# Patient Record
Sex: FEMALE | Race: WHITE
[De-identification: names, ages, dates, MRNs, and addresses within clinical notes are randomized per-mention and may not be internally consistent; named-entity substitution may affect disease eponyms.]

---

## 2017-09-19 ENCOUNTER — HOSPITAL ENCOUNTER (OUTPATIENT)
Dept: HOSPITAL 45 - C.LAB1850 | Age: 27
Discharge: HOME | End: 2017-09-19
Attending: INTERNAL MEDICINE
Payer: COMMERCIAL

## 2017-09-19 DIAGNOSIS — Z13.1: ICD-10-CM

## 2017-09-19 DIAGNOSIS — Z13.220: ICD-10-CM

## 2017-09-19 DIAGNOSIS — Z00.00: Primary | ICD-10-CM

## 2017-09-19 LAB
ALBUMIN/GLOB SERPL: 1.1 {RATIO} (ref 0.9–2)
ALP SERPL-CCNC: 47 U/L (ref 45–117)
ALT SERPL-CCNC: 19 U/L (ref 12–78)
ANION GAP SERPL CALC-SCNC: 3 MMOL/L (ref 3–11)
AST SERPL-CCNC: 16 U/L (ref 15–37)
BUN SERPL-MCNC: 9 MG/DL (ref 7–18)
BUN/CREAT SERPL: 10.6 (ref 10–20)
CALCIUM SERPL-MCNC: 8.9 MG/DL (ref 8.5–10.1)
CHLORIDE SERPL-SCNC: 108 MMOL/L (ref 98–107)
CHOLEST/HDLC SERPL: 2.1 {RATIO}
CO2 SERPL-SCNC: 28 MMOL/L (ref 21–32)
CREAT SERPL-MCNC: 0.81 MG/DL (ref 0.6–1.2)
EOSINOPHIL NFR BLD AUTO: 215 K/UL (ref 130–400)
EST. AVERAGE GLUCOSE BLD GHB EST-MCNC: 91 MG/DL
GLOBULIN SER-MCNC: 3.4 GM/DL (ref 2.5–4)
GLUCOSE SERPL-MCNC: 76 MG/DL (ref 70–99)
GLUCOSE UR QL: 62 MG/DL
HCT VFR BLD CALC: 36 % (ref 37–47)
KETONES UR QL STRIP: 58 MG/DL
MCH RBC QN AUTO: 32.3 PG (ref 25–34)
MCHC RBC AUTO-ENTMCNC: 34.4 G/DL (ref 32–36)
MCV RBC AUTO: 93.8 FL (ref 80–100)
NITRITE UR QL STRIP: 41 MG/DL (ref 0–150)
PH UR: 128 MG/DL (ref 0–200)
PMV BLD AUTO: 10.2 FL (ref 7.4–10.4)
POTASSIUM SERPL-SCNC: 3.9 MMOL/L (ref 3.5–5.1)
RBC # BLD AUTO: 3.84 M/UL (ref 4.2–5.4)
SODIUM SERPL-SCNC: 139 MMOL/L (ref 136–145)
VERY LOW DENSITY LIPOPROT CALC: 8 MG/DL
WBC # BLD AUTO: 6.99 K/UL (ref 4.8–10.8)

## 2017-10-06 ENCOUNTER — HOSPITAL ENCOUNTER (OUTPATIENT)
Dept: HOSPITAL 45 - C.PAPS | Age: 27
Discharge: HOME | End: 2017-10-06
Attending: OBSTETRICS & GYNECOLOGY
Payer: COMMERCIAL

## 2017-10-06 DIAGNOSIS — Z01.419: Primary | ICD-10-CM

## 2017-11-21 ENCOUNTER — HOSPITAL ENCOUNTER (OUTPATIENT)
Dept: HOSPITAL 45 - C.LABSPEC | Age: 27
Discharge: HOME | End: 2017-11-21
Attending: OBSTETRICS & GYNECOLOGY
Payer: COMMERCIAL

## 2017-11-21 DIAGNOSIS — Z34.01: Primary | ICD-10-CM

## 2017-11-21 LAB
APPEARANCE UR: CLEAR
BILIRUB UR-MCNC: (no result) MG/DL
COLOR UR: YELLOW
MANUAL MICROSCOPIC REQUIRED?: NO
NITRITE UR QL STRIP: (no result)
PH UR STRIP: 6.5 [PH] (ref 4.5–7.5)
REVIEW REQ?: NO
SP GR UR STRIP: 1.01 (ref 1–1.03)
URINE BILL WITH OR WITHOUT MIC: (no result)
UROBILINOGEN UR-MCNC: (no result) MG/DL

## 2017-11-28 ENCOUNTER — HOSPITAL ENCOUNTER (OUTPATIENT)
Dept: HOSPITAL 45 - C.LAB1850 | Age: 27
Discharge: HOME | End: 2017-11-28
Attending: OBSTETRICS & GYNECOLOGY
Payer: COMMERCIAL

## 2017-11-28 DIAGNOSIS — Z34.01: Primary | ICD-10-CM

## 2017-11-28 LAB
BASOPHILS # BLD: 0.03 K/UL (ref 0–0.2)
BASOPHILS NFR BLD: 0.4 %
COMPLETE: YES
EOSINOPHIL NFR BLD AUTO: 279 K/UL (ref 130–400)
HCT VFR BLD CALC: 34 % (ref 37–47)
IG%: 0.1 %
IMM GRANULOCYTES NFR BLD AUTO: 16.1 %
LYMPHOCYTES # BLD: 1.33 K/UL (ref 1.2–3.4)
MCH RBC QN AUTO: 32.7 PG (ref 25–34)
MCHC RBC AUTO-ENTMCNC: 35.6 G/DL (ref 32–36)
MCV RBC AUTO: 91.9 FL (ref 80–100)
MONOCYTES NFR BLD: 8.1 %
NEUTROPHILS # BLD AUTO: 0.4 %
NEUTROPHILS NFR BLD AUTO: 74.9 %
PMV BLD AUTO: 9.9 FL (ref 7.4–10.4)
RBC # BLD AUTO: 3.7 M/UL (ref 4.2–5.4)
WBC # BLD AUTO: 8.27 K/UL (ref 4.8–10.8)

## 2017-12-01 LAB
CHLAMYDIA TRACH RNA***: NOT DETECTED
GC (NEIS GONORRHOEAE)RNA**: NOT DETECTED

## 2018-01-22 ENCOUNTER — HOSPITAL ENCOUNTER (OUTPATIENT)
Dept: HOSPITAL 45 - C.LAB | Age: 28
Discharge: HOME | End: 2018-01-22
Attending: OBSTETRICS & GYNECOLOGY
Payer: COMMERCIAL

## 2018-01-22 DIAGNOSIS — Z34.02: Primary | ICD-10-CM

## 2018-03-06 ENCOUNTER — HOSPITAL ENCOUNTER (EMERGENCY)
Dept: HOSPITAL 45 - C.EDB | Age: 28
Discharge: HOME | End: 2018-03-06
Payer: COMMERCIAL

## 2018-03-06 VITALS
WEIGHT: 129.19 LBS | BODY MASS INDEX: 25.36 KG/M2 | BODY MASS INDEX: 25.36 KG/M2 | HEIGHT: 60 IN | WEIGHT: 129.19 LBS | HEIGHT: 60 IN

## 2018-03-06 VITALS — OXYGEN SATURATION: 100 % | SYSTOLIC BLOOD PRESSURE: 118 MMHG | HEART RATE: 80 BPM | DIASTOLIC BLOOD PRESSURE: 78 MMHG

## 2018-03-06 VITALS — TEMPERATURE: 98.06 F

## 2018-03-06 DIAGNOSIS — Z3A.22: ICD-10-CM

## 2018-03-06 DIAGNOSIS — O26.892: ICD-10-CM

## 2018-03-06 DIAGNOSIS — R19.7: Primary | ICD-10-CM

## 2018-03-06 DIAGNOSIS — Z79.899: ICD-10-CM

## 2018-03-06 LAB
ALBUMIN SERPL-MCNC: 3 GM/DL (ref 3.4–5)
ALP SERPL-CCNC: 50 U/L (ref 45–117)
ALT SERPL-CCNC: 31 U/L (ref 12–78)
AST SERPL-CCNC: 19 U/L (ref 15–37)
BASOPHILS # BLD: 0.01 K/UL (ref 0–0.2)
BASOPHILS NFR BLD: 0.1 %
BUN SERPL-MCNC: 9 MG/DL (ref 7–18)
CALCIUM SERPL-MCNC: 8.5 MG/DL (ref 8.5–10.1)
CO2 SERPL-SCNC: 22 MMOL/L (ref 21–32)
CREAT SERPL-MCNC: 0.62 MG/DL (ref 0.6–1.2)
EOS ABS #: 0.01 K/UL (ref 0–0.5)
EOSINOPHIL NFR BLD AUTO: 252 K/UL (ref 130–400)
GLUCOSE SERPL-MCNC: 90 MG/DL (ref 70–99)
HCT VFR BLD CALC: 34.6 % (ref 37–47)
HGB BLD-MCNC: 12.1 G/DL (ref 12–16)
IG#: 0.07 K/UL (ref 0–0.02)
IMM GRANULOCYTES NFR BLD AUTO: 3.5 %
LIPASE: 97 U/L (ref 73–393)
LYMPHOCYTES # BLD: 0.49 K/UL (ref 1.2–3.4)
MCH RBC QN AUTO: 32.8 PG (ref 25–34)
MCHC RBC AUTO-ENTMCNC: 35 G/DL (ref 32–36)
MCV RBC AUTO: 93.8 FL (ref 80–100)
MONO ABS #: 0.79 K/UL (ref 0.11–0.59)
MONOCYTES NFR BLD: 5.6 %
NEUT ABS #: 12.67 K/UL (ref 1.4–6.5)
NEUTROPHILS # BLD AUTO: 0.1 %
NEUTROPHILS NFR BLD AUTO: 90.2 %
PMV BLD AUTO: 9.7 FL (ref 7.4–10.4)
POTASSIUM SERPL-SCNC: 3.6 MMOL/L (ref 3.5–5.1)
PROT SERPL-MCNC: 7 GM/DL (ref 6.4–8.2)
RED CELL DISTRIBUTION WIDTH CV: 12.8 % (ref 11.5–14.5)
RED CELL DISTRIBUTION WIDTH SD: 43.9 FL (ref 36.4–46.3)
SODIUM SERPL-SCNC: 135 MMOL/L (ref 136–145)
WBC # BLD AUTO: 14.04 K/UL (ref 4.8–10.8)

## 2018-03-06 NOTE — EMERGENCY ROOM VISIT NOTE
History


Report prepared by Wilfred:  Barbra Leung


Under the Supervision of:  Dr. Hailey Wallace M.D.


First contact with patient:  09:01


Chief Complaint:  DIARRHEA


Stated Complaint:  SEVERE DIARRHEA W/BLOOD & 22 WKS PREGNANT


Nursing Triage Summary:  


pt reports bilat lower abd pain started at 0100 this am has blood in stool. 

fesl 


nauseated and vomiting . unsure if related to pregnancy (Morning sickness).





History of Present Illness


The patient is a 27 year old female who presents to the Emergency Room with 

complaints of intermittent diarrhea for the past 8 hours. Last night she 

developed lower abdominal pain that she rates as a 6/10 - 10/10 in severity and 

describes as "achy." She then developed diarrhea. She states that she has had 20

+ episodes of diarrhea throughout the morning. She is now just passing mucous 

and bloody stools. She reports bright red blood in her stools. She has been 

unable to keep fluids down. Last night she had yogurt from the [employer] and 

chinese food from [employer] for dinner. Her  ate the same thing and 

has not been sick. The patient is  and is currently 22 weeks pregnant. She 

has been experiencing some morning sickness with her pregnancy and did have 1 

episode of vomiting this morning. Pt had a brownish d/c earlier this week, but 

was  told by OB it was ok.  Pt feels baby moving and denies BRB vaginally or 

change in d/c.  The patient denies any fevers, chest pain, shortness of breath, 

or urinary symptoms. She denies any recent sick contacts. She denies any 

personal history of IBS or other bowel problems.





   Source of History:  patient


   Onset:  8 hours PTA


   Position:  abdomen


   Symptom Intensity:  6/10-10/10


   Quality:  other (diarrhea)


   Timing:  intermittent


   Associated Symptoms:  + vomiting, + abdominal pain, + hematochezia, No fevers

, No chest pain, No SOB, No urinary symptoms


Note:


Pt is 22 weeks pregnant.





Review of Systems


See HPI for pertinent positives & negatives. A total of 10 systems reviewed and 

were otherwise negative.





Past Medical & Surgical


Medical Problems:


(1) 22 weeks gestation of pregnancy








Family History


No pertinent history stated.





Social History


Smoking Status:  Never Smoker


Marital Status:  


Housing Status:  lives with significant other


Occupation Status:  employed





Current/Historical Medications


Scheduled


Multivit/Min/Iron/Fol Ac/Pren (Prenatal Vitamin), 1 TAB PO DAILY


Ondasetron Odt (Zofran Odt), 4 MG SL Q6H


Pyridoxine (Vitamin B6), 100 MG PO DAILY





Scheduled PRN


Dicyclomine Hcl (Bentyl), 10 MG PO Q6 PRN for cramping


Doxylamine Succinate (Sleep) (Unisom), 25 MG PO UD PRN for Nausea





Allergies


Coded Allergies:  


     No Known Allergies (Unverified , 3/6/18)





Physical Exam


Vital Signs











  Date Time  Temp Pulse Resp B/P (MAP) Pulse Ox O2 Delivery O2 Flow Rate FiO2


 


3/6/18 14:06  80 17 118/78 100 Room Air  


 


3/6/18 13:00  72 18 118/78 100 Room Air  


 


3/6/18 11:13  80 17 115/71 100 Room Air  


 


3/6/18 10:08  84      


 


3/6/18 09:45  76 18 114/77 100 Room Air  


 


3/6/18 08:49 36.7 89 18 117/81 100 Room Air  











Physical Exam


Vital signs reviewed.





General: Well-appearing 27 year old female, in no significant distress.





HEENT: No scleral icterus, PERRLA, neck supple.  Atraumatic.





Cardiovascular: Regular rate and rhythm, no extra sounds.





Pulmonary: Clear to auscultation bilaterally, normal work of breathing.





Abdomen: Soft, gravid, nontender, nondistended, positive bowel sounds.





Musculoskeletal: Atraumatic, no peripheral edema.





Neurologic: Patient awake alert and oriented x 3





Skin: Warm, dry, no rash





Medical Decision & Procedures


Laboratory Results


3/6/18 09:50








Red Blood Count 3.69, Mean Corpuscular Volume 93.8, Mean Corpuscular Hemoglobin 

32.8, Mean Corpuscular Hemoglobin Concent 35.0, Mean Platelet Volume 9.7, 

Neutrophils (%) (Auto) 90.2, Lymphocytes (%) (Auto) 3.5, Monocytes (%) (Auto) 

5.6, Eosinophils (%) (Auto) 0.1, Basophils (%) (Auto) 0.1, Neutrophils # (Auto) 

12.67, Lymphocytes # (Auto) 0.49, Monocytes # (Auto) 0.79, Eosinophils # (Auto) 

0.01, Basophils # (Auto) 0.01





3/6/18 09:50

















Test


  3/6/18


09:50 3/6/18


10:50


 


White Blood Count


  14.04 K/uL


(4.8-10.8) 


 


 


Red Blood Count


  3.69 M/uL


(4.2-5.4) 


 


 


Hemoglobin


  12.1 g/dL


(12.0-16.0) 


 


 


Hematocrit 34.6 % (37-47)  


 


Mean Corpuscular Volume


  93.8 fL


() 


 


 


Mean Corpuscular Hemoglobin


  32.8 pg


(25-34) 


 


 


Mean Corpuscular Hemoglobin


Concent 35.0 g/dl


(32-36) 


 


 


Platelet Count


  252 K/uL


(130-400) 


 


 


Mean Platelet Volume


  9.7 fL


(7.4-10.4) 


 


 


Neutrophils (%) (Auto) 90.2 %  


 


Lymphocytes (%) (Auto) 3.5 %  


 


Monocytes (%) (Auto) 5.6 %  


 


Eosinophils (%) (Auto) 0.1 %  


 


Basophils (%) (Auto) 0.1 %  


 


Neutrophils # (Auto)


  12.67 K/uL


(1.4-6.5) 


 


 


Lymphocytes # (Auto)


  0.49 K/uL


(1.2-3.4) 


 


 


Monocytes # (Auto)


  0.79 K/uL


(0.11-0.59) 


 


 


Eosinophils # (Auto)


  0.01 K/uL


(0-0.5) 


 


 


Basophils # (Auto)


  0.01 K/uL


(0-0.2) 


 


 


RDW Standard Deviation


  43.9 fL


(36.4-46.3) 


 


 


RDW Coefficient of Variation


  12.8 %


(11.5-14.5) 


 


 


Immature Granulocyte % (Auto) 0.5 %  


 


Immature Granulocyte # (Auto)


  0.07 K/uL


(0.00-0.02) 


 


 


Anion Gap


  8.0 mmol/L


(3-11) 


 


 


Est Creatinine Clear Calc


Drug Dose 109.2 ml/min 


  


 


 


Estimated GFR (


American) 143.2 


  


 


 


Estimated GFR (Non-


American 123.6 


  


 


 


BUN/Creatinine Ratio 14.2 (10-20)  


 


Calcium Level


  8.5 mg/dl


(8.5-10.1) 


 


 


Magnesium Level


  2.0 mg/dl


(1.8-2.4) 


 


 


Total Bilirubin


  0.4 mg/dl


(0.2-1) 


 


 


Direct Bilirubin


  0.1 mg/dl


(0-0.2) 


 


 


Aspartate Amino Transf


(AST/SGOT) 19 U/L (15-37) 


  


 


 


Alanine Aminotransferase


(ALT/SGPT) 31 U/L (12-78) 


  


 


 


Alkaline Phosphatase


  50 U/L


() 


 


 


Total Protein


  7.0 gm/dl


(6.4-8.2) 


 


 


Albumin


  3.0 gm/dl


(3.4-5.0) 


 


 


Lipase


  97 U/L


() 


 


 


Urine Color  YELLOW 


 


Urine Appearance  CLEAR (CLEAR) 


 


Urine pH  6.5 (4.5-7.5) 


 


Urine Specific Gravity


  


  1.006


(1.000-1.030)


 


Urine Protein  NEG (NEG) 


 


Urine Glucose (UA)  NEG (NEG) 


 


Urine Ketones  TRACE (NEG) 


 


Urine Occult Blood  NEG (NEG) 


 


Urine Nitrite  NEG (NEG) 


 


Urine Bilirubin  NEG (NEG) 


 


Urine Urobilinogen  NEG (NEG) 


 


Urine Leukocyte Esterase  TRACE (NEG) 


 


Urine WBC (Auto)  1-5 /hpf (0-5) 


 


Urine RBC (Auto)  0-4 /hpf (0-4) 


 


Urine Hyaline Casts (Auto)  1-5 /lpf (0-5) 


 


Urine Epithelial Cells (Auto)


  


  20-30 /lpf


(0-5)


 


Urine Bacteria (Auto)  1+ (NEG) 





Laboratory results per my review.





Medications Administered











 Medications


  (Trade)  Dose


 Ordered  Sig/Jen


 Route  Start Time


 Stop Time Status Last Admin


Dose Admin


 


 Sodium Chloride  1,000 ml @ 


 999 mls/hr  Q1H1M STAT


 IV  3/6/18 09:19


 3/6/18 10:19 DC 3/6/18 09:48


999 MLS/HR


 


 Ondansetron HCl


  (Zofran Inj)  4 mg  NOW  STAT


 IV  3/6/18 09:19


 3/6/18 09:25 DC 3/6/18 09:56


4 MG


 


 Dicyclomine HCl


  (Bentyl Cap)  10 mg  NOW  ONCE


 PO  3/6/18 12:30


 3/6/18 12:31 DC 3/6/18 12:38


10 MG


 


 Ondansetron HCl


  (Zofran Inj)  4 mg  NOW  STAT


 IV  3/6/18 12:26


 3/6/18 12:29 DC 3/6/18 12:38


4 MG


 


 Acetaminophen 650


 mg/Empty Bag  65 ml @ 


 260 mls/hr  NOW  STAT


 IV  3/6/18 12:26


 3/6/18 12:40 DC 3/6/18 12:52


260 MLS/HR


 


 Sodium Chloride  1,000 ml @ 


 150 mls/hr  Q6H40M STAT


 IV  3/6/18 12:26


 3/6/18 14:28 DC 3/6/18 12:38


150 MLS/HR











ED Course


09: Past medical records reviewed. The patient was evaluated in room B5. A 

complete history and physical examination was performed. 





0919: Zofran 4 mg IV, NSS 1000 ml @ 999 mls/hr IV





1224: I reassessed the patient and she is still complaining of symptoms. 





1226: NSS 1000 ml @ 150 mls/hr IV, Acetaminophen 65 ml @ 260 mls/hr IV, Zofran 

4 mg IV





1230: Bentyl 10 mg PO 





1343: I reassessed the patient at this time. She is feeling better and resting 

comfortably. I discussed the results and treatment plan with the patient. I 

answered all pertaining questions that she had. She expressed understanding and 

verbalized agreement. The patient will be discharged home.





Medical Decision


Differential diagnoses includes viral illness, GI bleed, food borne illness, 

colitis, diverticulitis, C-diff, dehydration. 





This pt was evaluated and appeared to be in no distress.  IV access was 

obtained and lab work was drawn.  Pt was hydrated with NSS, given IV zofran.  

Lab work is significant for mild leukocytosis, likely d/t pregnancy and stress 

reaction.  Pt had no further vomiting or diarrhea in the ED.  No stool sample 

was obtained.  She c/o bowel cramping.  FHT were 156.  Pt felt much improved 

after IV acetaminophen, po bentyl and additional zofran.  Pt was given a short 

Rx for bentyl and zofran.  She was instructed on a clear liquid diet and 

advancing slowly as tolerated.  Pt will f/u with OBGYN and return to the ED for 

worsening of symptoms or any medical concerns.





Medication Reconcilliation


Current Medication List:  was personally reviewed by me





Blood Pressure Screening


Patient's blood pressure:  Normal blood pressure





Impression





 Primary Impression:  


 Diarrhea


 Additional Impression:  


 22 weeks gestation of pregnancy





Scribe Attestation


The scribe's documentation has been prepared under my direction and personally 

reviewed by me in its entirety. I confirm that the note above accurately 

reflects all work, treatment, procedures, and medical decision making performed 

by me.





Departure Information


Dispostion


Home / Self-Care





Prescriptions





Ondasetron Odt (ZOFRAN ODT) 4 Mg Tab


4 MG SL Q6H for Nausea, #10 TAB


   Prov: Hailey Wallace M.D.         3/6/18 


Dicyclomine Hcl (BENTYL) 10 Mg Cap


10 MG PO Q6 Y for cramping, #20 CAP


   Prov: Hailey Wallace M.D.         3/6/18





Referrals


Adama Hogue M.D. (PCP)








Marta Butler M.D.





Forms


HOME CARE DOCUMENTATION FORM,                                                 

               IMPORTANT VISIT INFORMATION, WORK / SCHOOL INSTRUCTIONS





Patient Instructions


My [facility]





Additional Instructions





Diagnosis: Diarrhea





Drink plenty of clear fluids.





Advance your diet slowly as tolerated: BRAT diet.  Bananas, rice, applesauce 

and toast.





Bentyl 10 mg every 6 hours as needed for abdominal cramping.





Tylenol 650 mg every 6 hours as needed for pain or fever.





Zofran 4 mg every 6 hours as needed for nausea.





Return to the ER for worsening of symptoms or any medical concerns.





Problem Qualifiers

## 2018-04-19 ENCOUNTER — HOSPITAL ENCOUNTER (OUTPATIENT)
Dept: HOSPITAL 45 - C.LAB1850 | Age: 28
Discharge: HOME | End: 2018-04-19
Attending: OBSTETRICS & GYNECOLOGY
Payer: COMMERCIAL

## 2018-04-19 ENCOUNTER — HOSPITAL ENCOUNTER (OUTPATIENT)
Dept: HOSPITAL 45 - C.LABSPEC | Age: 28
Discharge: HOME | End: 2018-04-19
Attending: OBSTETRICS & GYNECOLOGY
Payer: COMMERCIAL

## 2018-04-19 DIAGNOSIS — Z34.03: Primary | ICD-10-CM

## 2018-04-19 LAB
HCT VFR BLD CALC: 31.4 % (ref 37–47)
HGB BLD-MCNC: 10.8 G/DL (ref 12–16)

## 2018-07-31 ENCOUNTER — HOSPITAL ENCOUNTER (OUTPATIENT)
Dept: HOSPITAL 45 - C.LABSPEC | Age: 28
Discharge: HOME | End: 2018-07-31
Attending: OBSTETRICS & GYNECOLOGY
Payer: COMMERCIAL

## 2018-07-31 DIAGNOSIS — L03.90: Primary | ICD-10-CM
